# Patient Record
Sex: FEMALE | NOT HISPANIC OR LATINO | URBAN - METROPOLITAN AREA
[De-identification: names, ages, dates, MRNs, and addresses within clinical notes are randomized per-mention and may not be internally consistent; named-entity substitution may affect disease eponyms.]

---

## 2025-06-10 ENCOUNTER — EMERGENCY (EMERGENCY)
Facility: HOSPITAL | Age: 89
LOS: 0 days | Discharge: HOME | End: 2025-06-10
Attending: STUDENT IN AN ORGANIZED HEALTH CARE EDUCATION/TRAINING PROGRAM
Payer: MEDICARE

## 2025-06-10 VITALS
WEIGHT: 119.93 LBS | SYSTOLIC BLOOD PRESSURE: 148 MMHG | TEMPERATURE: 98 F | RESPIRATION RATE: 20 BRPM | OXYGEN SATURATION: 94 % | HEART RATE: 50 BPM | DIASTOLIC BLOOD PRESSURE: 62 MMHG | HEIGHT: 63 IN

## 2025-06-10 VITALS
OXYGEN SATURATION: 95 % | SYSTOLIC BLOOD PRESSURE: 173 MMHG | DIASTOLIC BLOOD PRESSURE: 69 MMHG | HEART RATE: 53 BPM | TEMPERATURE: 97 F | RESPIRATION RATE: 18 BRPM

## 2025-06-10 LAB
APPEARANCE UR: ABNORMAL
BACTERIA # UR AUTO: ABNORMAL /HPF
BILIRUB UR-MCNC: NEGATIVE — SIGNIFICANT CHANGE UP
COLOR SPEC: YELLOW — SIGNIFICANT CHANGE UP
DIFF PNL FLD: ABNORMAL
GLUCOSE UR QL: 500 MG/DL
KETONES UR QL: NEGATIVE MG/DL — SIGNIFICANT CHANGE UP
LEUKOCYTE ESTERASE UR-ACNC: ABNORMAL
NITRITE UR-MCNC: NEGATIVE — SIGNIFICANT CHANGE UP
PH UR: 6.5 — SIGNIFICANT CHANGE UP (ref 5–8)
PROT UR-MCNC: 30 MG/DL
RBC CASTS # UR COMP ASSIST: 5 /HPF — HIGH (ref 0–4)
SP GR SPEC: 1.01 — SIGNIFICANT CHANGE UP (ref 1–1.03)
UROBILINOGEN FLD QL: 0.2 MG/DL — SIGNIFICANT CHANGE UP (ref 0.2–1)
WBC UR QL: 80 /HPF — HIGH (ref 0–5)

## 2025-06-10 PROCEDURE — 72125 CT NECK SPINE W/O DYE: CPT | Mod: 26

## 2025-06-10 PROCEDURE — 70450 CT HEAD/BRAIN W/O DYE: CPT | Mod: 26

## 2025-06-10 PROCEDURE — 73560 X-RAY EXAM OF KNEE 1 OR 2: CPT | Mod: 26,LT,GW

## 2025-06-10 PROCEDURE — 71045 X-RAY EXAM CHEST 1 VIEW: CPT | Mod: 26,GW

## 2025-06-10 PROCEDURE — 73020 X-RAY EXAM OF SHOULDER: CPT | Mod: 26,XE,LT

## 2025-06-10 PROCEDURE — 93010 ELECTROCARDIOGRAM REPORT: CPT

## 2025-06-10 PROCEDURE — 73060 X-RAY EXAM OF HUMERUS: CPT | Mod: 26,LT,GW

## 2025-06-10 PROCEDURE — 99285 EMERGENCY DEPT VISIT HI MDM: CPT | Mod: FS

## 2025-06-10 PROCEDURE — 73090 X-RAY EXAM OF FOREARM: CPT | Mod: 26,LT,GW

## 2025-06-10 PROCEDURE — 72170 X-RAY EXAM OF PELVIS: CPT | Mod: 26

## 2025-06-10 PROCEDURE — 73030 X-RAY EXAM OF SHOULDER: CPT | Mod: 26,LT

## 2025-06-10 RX ORDER — CEPHALEXIN 250 MG/1
1 CAPSULE ORAL
Qty: 14 | Refills: 0
Start: 2025-06-10 | End: 2025-06-16

## 2025-06-10 RX ORDER — ACETAMINOPHEN 500 MG/5ML
650 LIQUID (ML) ORAL ONCE
Refills: 0 | Status: COMPLETED | OUTPATIENT
Start: 2025-06-10 | End: 2025-06-10

## 2025-06-10 RX ORDER — CEFTRIAXONE 500 MG/1
1000 INJECTION, POWDER, FOR SOLUTION INTRAMUSCULAR; INTRAVENOUS ONCE
Refills: 0 | Status: DISCONTINUED | OUTPATIENT
Start: 2025-06-10 | End: 2025-06-10

## 2025-06-10 RX ORDER — CEPHALEXIN 250 MG/1
500 CAPSULE ORAL ONCE
Refills: 0 | Status: COMPLETED | OUTPATIENT
Start: 2025-06-10 | End: 2025-06-10

## 2025-06-10 RX ADMIN — CEPHALEXIN 500 MILLIGRAM(S): 250 CAPSULE ORAL at 15:39

## 2025-06-10 RX ADMIN — Medication 650 MILLIGRAM(S): at 11:26

## 2025-06-10 RX ADMIN — Medication 650 MILLIGRAM(S): at 12:21

## 2025-06-10 NOTE — ED PROVIDER NOTE - NSFOLLOWUPINSTRUCTIONS_ED_ALL_ED_FT
Xrays were negative for new or acute fractures.   CT head was negative for hemorrhage or bleeding.   CT C-spine was negative for acute fractures.  Urine positive for UTI.   First dose keflex 500mg given here, keflex 500mg BID x 7 days sent to [facility] pharmacy.                   A urinary tract infection (UTI) is an infection in any part of your urinary system — your kidneys, ureters, bladder and urethra. Most infections involve the lower urinary tract — the bladder and the urethra. Women are at greater risk of developing a UTI than are men.    Types of UTIs    Cystitis (bladder infection): This type of UTI is usually caused by E. coli, a type of bacteria commonly found in the gastrointestinal (GI) tract. Sexual intercourse may lead to cystitis, but you don't have to be sexually active to develop it. All women are at risk of cystitis because of their anatomy — specifically, the short distance from the urethra to the anus and the urethral opening to the bladder.  Urethritis (urethral infection): This type of UTI can occur when GI bacteria spread from the anus to the urethra. Also, because the female urethra is close to the vagina, sexually transmitted infections, such as herpes, gonorrhea, chlamydia and mycoplasma, can cause urethritis.  Pyelonephritis (kidney infection): A kidney infection typically occurs when bacteria enter the bladder and then travel up to the kidneys. It can be very serious and even life-threatening.  Symptoms    A strong, persistent urge to urinate  A burning sensation when urinating  Passing frequent, small amounts of urine  Urine that appears cloudy  Urine that appears red, bright pink or cola-colored — a sign of blood in the urine  Strong-smelling urine  Pelvic pain, in women — especially in the center of the pelvis and around the area of the pubic bone  Causes UTIs typically occur when bacteria enter the urinary tract through the urethra and begin to multiply in the bladder. Although the urinary system is designed to keep out such microscopic invaders, these defenses sometimes fail. When that happens, bacteria may take hold and grow into a full-blown infection in the urinary tract. Most cases of cystitis are caused by Escherichia coli (E. coli), a type of bacteria commonly found in the gastrointestinal (GI) tract. However, sometimes other bacteria are responsible. Sexual intercourse may lead to cystitis and other UTIs. UTIs sometimes occur after menopause. Menopause causes changes in the urinary tract, which can make you more vulnerable to infection.    Risk Factors    Female anatomy. A woman has a shorter urethra than a man does, which shortens the distance that bacteria must travel to reach the bladder.  Sexual activity. Sexually active women tend to have more UTIs than do women who aren't sexually active. Having a new sexual partner also increases your risk.  Certain types of birth control. Women who use diaphragms for birth control may be at higher risk, as well as women who use spermicidal agents.  Menopause. After menopause, a decline in circulating estrogen causes changes in the urinary tract that make you more vulnerable to infection.  Blockages in the urinary tract. Kidney stones or an enlarged prostate can trap urine in the bladder and increase the risk of UTIs.  Suppressed immune system. Diabetes and other diseases that impair the immune system can increase the risk of UTIs.  Catheter use. Using a urinary catheter, a tube inserted into the urethra to drain urine from the bladder, increases the risk of UTIs.  Recent urinary procedures. Urinary tract surgery or an examination of your urinary tract that involves medical instruments can both increase your risk of developing a UTI.  Complications When treated promptly and correctly, lower urinary tract infections rarely lead to complications. But left untreated, these infections can have serious consequences, such as:    Recurrent infections, especially in women. Some women experience two or more UTIs in a six-month period or four or more within a year.  Permanent kidney damage from an acute or chronic kidney infection (pyelonephritis).  Increased risk in pregnant women of delivering low birth weight or premature infants.  Urethral narrowing (stricture) in men from recurrent urethritis. Scar tissue created by UTIs can narrow the urethra, making urination difficult.  Sepsis. Kidney infections can lead to a life-threatening complication of an infection that spreads to the blood.  Prevention    Drink plenty of liquids, especially water.  Wipe from front to back after urinating and after a bowel movement.  Empty your bladder soon after intercourse.  Avoid potentially irritating feminine products. Using deodorant sprays or other feminine products, such as douches and powders, in the genital area can irritate the urethra.  Change your birth control method. Diaphragms, or unlubricated or spermicide-treated condoms can all contribute to bacterial growth.  When to see a doctor: If you have signs or symptoms of a UTI, make an appointment to see your doctor.

## 2025-06-10 NOTE — ED PROVIDER NOTE - OBJECTIVE STATEMENT
89-year-old female past medical history of hypertension hyperlipidemia DM2 hypothyroid, resident at [facility], existing DNR/DNI MOLST was found this morning with blood on the back of her head and bruise to left elbow with skin tear.  Patient endorses left shoulder and elbow pain.  Endorses difficulty hearing which she says is new.  Patient states she does not remember what happened.  Patient denies headache visual changes numbness tingling chest pain shortness of breath abdominal pain nausea vomiting or diarrhea.  Per medication list forwarded from [facility] patient not on blood thinners.

## 2025-06-10 NOTE — ED PROVIDER NOTE - PHYSICAL EXAMINATION
Vital signs reviewed.   CONSTITUTIONAL:  Non-toxic appearing.   HEAD: Normocephalic, atraumatic. No battles sign, no periorbital bruising.   EYES: PERRL, EOM intact, conjunctiva and sclera WNL.  ENT: normal nose; no rhinorrhea; B/L TM- no hemotympanum, pearly grey, no drainage.   NECK/LYMPH: Supple; non-tender; no cervical lymphadenopathy.  CARD: RRR, Normal S1, S2; no murmurs, rubs, or gallops noted.  RESP: Normal chest excursion with respiration; breath sounds clear and equal bilaterally; no wheezes, rhonchi, or rales.  ABD/GI: soft, non-distended; non-tender; no palpable organomegaly, no pulsatile mass.  EXT/MS: +Deformity to left shoulder, tender, elbow tender, decreased shoulder and elbow ROM, FROM wrist, 5/5 hand .  No vertebral midline tenderness throughout spine. moves all extremities; no chest wall tenderness, stable pelvis, non tender. +2 left radial pulse, cap refill <2. Sensation intact.   SKIN: Left lateral elbow ecchymosis, 2cm skin tear, no active bleeding. Normal for age and race; warm; dry; good turgor  NEURO: Awake, alert, oriented to self and place does not know time, no gross deficits  PSYCH: Normal mood; appropriate affect.

## 2025-06-10 NOTE — ED PROVIDER NOTE - CLINICAL SUMMARY MEDICAL DECISION MAKING FREE TEXT BOX
89-year-old female past medical history of dementia on hospice, hypertension hyperlipidemia DM2 hypothyroid, resident at [facility], for complaint of possible fall with blood noted on back of head and elbow. Unwitnessed. Patient denies headache, SOB, or chest pain   On exam patient in NAD, VS non actionable. Significant for +Deformity to left shoulder, tender, elbow tender, decreased shoulder and elbow ROM, FROM wrist, 5/5 hand . moves all extremities; no chest wall tenderness, stable pelvis, non tender. +2 left radial pulse, cap refill <2. Sensation intact. No vertebral midline tenderness throughout spine.  Left lateral elbow ecchymosis, 2cm skin tear, no active bleeding.  DDx concern for dislocation vs fracture of LUE, r/o ICH, cervical fracture.  Plan- Xrays, CT head and Cspine, tylenol for pain.

## 2025-06-10 NOTE — ED ADULT NURSE NOTE - NSFALLHARMRISKINTERV_ED_ALL_ED

## 2025-06-10 NOTE — CONSULT NOTE ADULT - SUBJECTIVE AND OBJECTIVE BOX
Patient is a 89yFemale RHD home ambulator with assistive devices who presents to [facility] ED w/ a c/o of L UE pain s/p unwitnessed fall.   Patient unsure of how or why she fell, she presents from an assisted living facility   HS noted and CT of the Head negative, no LOC   Denies any numbness or tingling. Denies having any other pain elsewhere.   No other orthopedic concerns at this time.              No Known Allergies      PHYSICAL EXAM:  T(C): 36.8 (06-10-25 @ 09:55), Max: 36.8 (06-10-25 @ 09:55)  HR: 50 (06-10-25 @ 09:55) (50 - 50)  BP: 148/62 (06-10-25 @ 09:55) (148/62 - 148/62)  RR: 20 (06-10-25 @ 09:55) (20 - 20)  SpO2: 94% (06-10-25 @ 09:55) (94% - 94%)    Gen: NAD, Resting comfortably      LUE:  Skin intact, no erythema or ecchymosis; defomormity of the L shoudler and wrist, chronic in nature  mild bony tenderness to palpation to the wrist and shoudler  +AIN/PIN/Ulnar/Radial/Axilary Nerve intact  +SILT C5-T1  Patient able to range shoulder and wrist, limited due to pain  + Radial Pulse  Compartments soft and compressible  No forearm or arm tenderness      Secondary Survey:   RLE/LLE/RUE: No TTP over bony prominences, SILT, palpable pulses, full/painless range of motion, compartments soft    Spine: No bony tenderness. No palpable stepoffs.       A/P: 89 F with chornic L Proximal humerus and L DR fracture, on hospice care at this time, will offer non operative conservative managment of both injuries with sling and splint for comfort respectively  Analgesia  WBAT sling to shoulder and splint to wrist for comfort  Ice and elevate as tolerated  DVT ppx- SCD/ per ED  PT/OT as tolerated  No acute orthopedic surgical intervention indicated at this time  Orthopedically stable for Discharge  D/w attending in regard to the above plan; will updated as needed    ******************INCOMPLETE NOTE******************  ******************INCOMPLETE NOTE******************  ******************INCOMPLETE NOTE******************  ******************INCOMPLETE NOTE******************   Patient is a 89yFemale RHD home ambulator with assistive devices who presents to [facility] ED w/ a c/o of L UE pain s/p unwitnessed fall.   Patient unsure of how or why she fell, she presents from an assisted living facility, unable to provide reliable history.   Daughter at bedside states she was found in her bed this AM after she likely fell going to the bathroom, as she does often  HS noted and CT of the Head negative, no LOC   Notes history of chronic L prox humerus and BL DR fx in the past treated no opeatively  Denies any numbness or tingling. Denies having any other pain elsewhere.   No other orthopedic concerns at this time.              No Known Allergies      PHYSICAL EXAM:  T(C): 36.8 (06-10-25 @ 09:55), Max: 36.8 (06-10-25 @ 09:55)  HR: 50 (06-10-25 @ 09:55) (50 - 50)  BP: 148/62 (06-10-25 @ 09:55) (148/62 - 148/62)  RR: 20 (06-10-25 @ 09:55) (20 - 20)  SpO2: 94% (06-10-25 @ 09:55) (94% - 94%)    Gen: NAD, Resting comfortably      LUE:  Skin intact, no erythema or ecchymosis  mild bony tenderness to palpation to the shoulder, no pain to palpation at the wrist  +AIN/PIN/Ulnar/Radial/Axilary Nerve intact  +SILT C5-T1  Patient able to range shoulder and wrist,  shoulder ROM limited due to pain and previous injury  + Radial Pulse  Compartments soft and compressible  No forearm or arm tenderness      Secondary Survey:   RLE/RUE: No TTP over bony prominences, SILT, palpable pulses, full/painless range of motion, compartments soft    LLE: pain to palpation about the knee likely secondary to fall    Spine: No bony tenderness. No palpable stepoffs.       A/P: 89 F with chronic L Proximal humerus and L DR fracture, on hospice care at this time, will offer non operative conservative management of both injuries with sling and splint for comfort respectively  Analgesia  WBAT sling to shoulder and splint to wrist for comfort  obtain imaging of the L knee due to pain noted on secondary exam- please notify once completed  Ice and elevate as tolerated  DVT ppx- SCD/ per ED  PT/OT as tolerated  No acute orthopedic surgical intervention indicated at this time  Orthopedically stable for Discharge  D/w attending in regard to the above plan; will updated as needed

## 2025-06-10 NOTE — ED PROVIDER NOTE - ATTENDING APP SHARED VISIT CONTRIBUTION OF CARE
Discharge instructions given, aware of prescription and care to follow. No further questions at this time, verbalized understanding.    89-year-old female with past medical history of hypertension hyperlipidemia DM2 hypothyroid, resident at [facility] brought in by ambulance today after being found this morning with blood on the back of her head and bruise to left elbow with skin tear.  Pt does not recall what happened but does c/o left shoulder and elbow pain and  difficulty hearing which she says is ?new.  Patient denies headache visual changes numbness tingling chest pain shortness of breath abdominal pain nausea vomiting or diarrhea.  Her exam is as noted above, agree with NP physical assessment. Ct and xrays negative for acute fracture, UA positive for uti, pt started on keflex,  Pt is DNR and in hospice care, hospice was contacted, transport arranged for pt to go back to the [facility]

## 2025-06-10 NOTE — ED PROVIDER NOTE - NSICDXPASTMEDICALHX_GEN_ALL_CORE_FT
PAST MEDICAL HISTORY:  GERD (Gastroesophageal Reflux Disease)     HTN (Hypertension)     Hyperlipidemia     Hypothyroidism     Thyroid Malignant Neoplasm     Type II DM

## 2025-06-10 NOTE — ED PROVIDER NOTE - PROGRESS NOTE DETAILS
Yong NP: Spoke with hospice care coordinator Violeta Lee team [phone]- will coordinate ride back to [facility].

## 2025-06-10 NOTE — ED ADULT NURSE NOTE - OBJECTIVE STATEMENT
89 year old female presents to ED following an unwitnessed fall. Patient noted with hematoma to left dorsal part of head, left elbow skin tear and right skin abrasion. Patient denies any LOC. X-ray ordered.

## 2025-06-10 NOTE — ED PROVIDER NOTE - PATIENT PORTAL LINK FT
You can access the FollowMyHealth Patient Portal offered by [facility] by registering at the following website: [email or web address]. By joining [facility]’s FollowMyHealth portal, you will also be able to view your health information using other applications (apps) compatible with our system.

## 2025-06-12 LAB
CULTURE RESULTS: ABNORMAL
SPECIMEN SOURCE: SIGNIFICANT CHANGE UP

## 2025-07-04 ENCOUNTER — EMERGENCY (EMERGENCY)
Facility: HOSPITAL | Age: 89
LOS: 0 days | Discharge: HOME | End: 2025-07-05
Attending: STUDENT IN AN ORGANIZED HEALTH CARE EDUCATION/TRAINING PROGRAM
Payer: MEDICARE

## 2025-07-04 VITALS
RESPIRATION RATE: 16 BRPM | SYSTOLIC BLOOD PRESSURE: 131 MMHG | HEART RATE: 57 BPM | TEMPERATURE: 98 F | WEIGHT: 95.02 LBS | OXYGEN SATURATION: 97 % | HEIGHT: 63 IN | DIASTOLIC BLOOD PRESSURE: 74 MMHG

## 2025-07-04 DIAGNOSIS — Z04.3 ENCOUNTER FOR EXAMINATION AND OBSERVATION FOLLOWING OTHER ACCIDENT: ICD-10-CM
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
## 2025-07-04 PROCEDURE — 72125 CT NECK SPINE W/O DYE: CPT | Mod: 26

## 2025-07-04 PROCEDURE — 74176 CT ABD & PELVIS W/O CONTRAST: CPT | Mod: 26

## 2025-07-04 PROCEDURE — 71250 CT THORAX DX C-: CPT | Mod: 26

## 2025-07-04 PROCEDURE — 99284 EMERGENCY DEPT VISIT MOD MDM: CPT

## 2025-07-04 PROCEDURE — 70450 CT HEAD/BRAIN W/O DYE: CPT | Mod: 26

## 2025-07-04 NOTE — ED ADULT TRIAGE NOTE - TEMPERATURE IN FAHRENHEIT (DEGREES F)
[FreeTextEntry1] : Mental status: Orientation: Alert , oriented to month, day of week, year, location                                                                                                                                    Speech is fluent , able to name objects, repeat a sentence and write a sentence                                                                                                                                        Cranial nerves: CN I deferred. CN  II VFF; fundus exam benign; III, IV, VI: PERRLA, EOM IV: Facial sensation normal B/L to touch, pinprick and temperatureVII:Facial strength normal B/L. VIII: Gross hearing intact IX, X: palate midline and elevates symmetrically XI: Trapezius normal strength, XII: Tongue midline without atrophy or fasciculation Motor: Muscle tone no rigidity or resistance in all 4 extremities. No atrophy or fasciculation. Muscle strength: arms and legs, proximal and distal flexors and extensors are normal 5/5 . No UE drift. Sensory: reduced sensation in c7 distribution on right side  Reflexes: 1+ in all limbs Coordination: finger to nose: normal. Heel to shin: normal Gait: steady normal based ; Romberg test negative   right cervical paraspinal tenderness arms is warm to touch with good colr Radial pulses present both arms.  
97.8

## 2025-07-04 NOTE — ED ADULT NURSE NOTE - NSFALLHARMRISKINTERV_ED_ALL_ED

## 2025-07-04 NOTE — ED ADULT TRIAGE NOTE - CHIEF COMPLAINT QUOTE
PT from [facility] living found on floor nest to witness s/p unwitnessed fall.

## 2025-07-04 NOTE — ED ADULT NURSE NOTE - OBJECTIVE STATEMENT
Received pt s/p fall at assisted living. Pt reported she knew she fell but is unsure of the cause. Denies LOC, HA, head strike, lacerations, bleeding. Pt is A&Ox2-3. Unsure of year. No bleeding or deformities noted. Pt resting in bed. Safety maintained.

## 2025-07-05 VITALS
OXYGEN SATURATION: 94 % | DIASTOLIC BLOOD PRESSURE: 64 MMHG | HEART RATE: 62 BPM | TEMPERATURE: 98 F | SYSTOLIC BLOOD PRESSURE: 130 MMHG | RESPIRATION RATE: 16 BRPM

## 2025-07-05 NOTE — ED PROVIDER NOTE - CLINICAL SUMMARY MEDICAL DECISION MAKING FREE TEXT BOX
89 y/o F here today from assisted living s/p unwitnessed fall. Patient has no acute complaints. She cannot remember how she fell. Hx is limited secondary to dementia.    In the ED, vitals stable.    GENERAL: Awake, alert, NAD  HEENT: NC/AT, moist mucous membranes  LUNGS: CTAB, no wheezes or crackles   CARDIAC: RRR, no m/r/g  ABDOMEN: Soft, non tender, non distended, no rebound, no guarding  EXT: No edema, no calf tenderness, no deformities.  NEURO: A&Ox2. Moving all extremities.  SKIN: Warm and dry. No rash.  PSYCH: Normal affect.    Patient is poor historian. Will perform pan CT to r/o acute trauma.  Likely discharge.

## 2025-07-05 NOTE — ED PROVIDER NOTE - PATIENT PORTAL LINK FT
You can access the FollowMyHealth Patient Portal offered by [facility] by registering at the following website: [email or web address]. By joining [facility]’s FollowMyHealth portal, you will also be able to view your health information using other applications (apps) compatible with our system.